# Patient Record
Sex: MALE | Race: WHITE | URBAN - METROPOLITAN AREA
[De-identification: names, ages, dates, MRNs, and addresses within clinical notes are randomized per-mention and may not be internally consistent; named-entity substitution may affect disease eponyms.]

---

## 2019-02-28 ENCOUNTER — HISTORICAL (OUTPATIENT)
Dept: ADMINISTRATIVE | Facility: HOSPITAL | Age: 49
End: 2019-02-28

## 2019-02-28 LAB
ABS NEUT (OLG): 3.1 X10(3)/MCL (ref 2.1–9.2)
ALBUMIN SERPL-MCNC: 4.5 GM/DL (ref 3.4–5)
ALBUMIN/GLOB SERPL: 1.45 {RATIO} (ref 1.5–2.5)
ALP SERPL-CCNC: 51 UNIT/L (ref 38–126)
ALT SERPL-CCNC: 30 UNIT/L (ref 7–52)
AST SERPL-CCNC: 15 UNIT/L (ref 15–37)
BILIRUB SERPL-MCNC: 0.6 MG/DL (ref 0.2–1)
BILIRUBIN DIRECT+TOT PNL SERPL-MCNC: 0.1 MG/DL (ref 0–0.5)
BILIRUBIN DIRECT+TOT PNL SERPL-MCNC: 0.5 MG/DL
BUN SERPL-MCNC: 17 MG/DL (ref 7–18)
CALCIUM SERPL-MCNC: 8.7 MG/DL (ref 8.5–10)
CHLORIDE SERPL-SCNC: 104 MMOL/L (ref 98–107)
CHOLEST SERPL-MCNC: 152 MG/DL (ref 0–200)
CHOLEST/HDLC SERPL: 4.6 {RATIO}
CO2 SERPL-SCNC: 30 MMOL/L (ref 21–32)
CREAT SERPL-MCNC: 1.07 MG/DL (ref 0.6–1.3)
ERYTHROCYTE [DISTWIDTH] IN BLOOD BY AUTOMATED COUNT: 12.6 % (ref 11.5–17)
GLOBULIN SER-MCNC: 3.1 GM/DL (ref 1.2–3)
GLUCOSE SERPL-MCNC: 107 MG/DL (ref 74–106)
HCT VFR BLD AUTO: 48.1 % (ref 42–52)
HDLC SERPL-MCNC: 33 MG/DL (ref 35–60)
HGB BLD-MCNC: 15.5 GM/DL (ref 14–18)
LDLC SERPL CALC-MCNC: 80 MG/DL (ref 0–129)
LYMPHOCYTES # BLD AUTO: 1.6 X10(3)/MCL (ref 0.6–3.4)
LYMPHOCYTES NFR BLD AUTO: 31.7 % (ref 13–40)
MCH RBC QN AUTO: 28.9 PG (ref 27–31.2)
MCHC RBC AUTO-ENTMCNC: 32 GM/DL (ref 32–36)
MCV RBC AUTO: 90 FL (ref 80–94)
MONOCYTES # BLD AUTO: 0.4 X10(3)/MCL (ref 0.1–1.3)
MONOCYTES NFR BLD AUTO: 8.5 % (ref 0.1–24)
NEUTROPHILS NFR BLD AUTO: 59.8 % (ref 47–80)
PLATELET # BLD AUTO: 191 X10(3)/MCL (ref 130–400)
PMV BLD AUTO: 9.4 FL (ref 9.4–12.4)
POTASSIUM SERPL-SCNC: 4.4 MMOL/L (ref 3.5–5.1)
PROT SERPL-MCNC: 7.6 GM/DL (ref 6.4–8.2)
PSA SERPL-MCNC: 0.46 NG/ML (ref 0–3.5)
RBC # BLD AUTO: 5.36 X10(6)/MCL (ref 4.7–6.1)
SODIUM SERPL-SCNC: 138 MMOL/L (ref 136–145)
TRIGL SERPL-MCNC: 252 MG/DL (ref 30–150)
TSH SERPL-ACNC: 1.31 MIU/ML (ref 0.35–4.94)
VLDLC SERPL CALC-MCNC: 50.4 MG/DL
WBC # SPEC AUTO: 5.1 X10(3)/MCL (ref 4.5–11.5)

## 2019-11-04 ENCOUNTER — HISTORICAL (OUTPATIENT)
Dept: ADMINISTRATIVE | Facility: HOSPITAL | Age: 49
End: 2019-11-04

## 2019-11-04 LAB
ALBUMIN SERPL-MCNC: 4.5 GM/DL (ref 3.4–5)
ALBUMIN/GLOB SERPL: 1.55 {RATIO} (ref 1.5–2.5)
ALP SERPL-CCNC: 47 UNIT/L (ref 38–126)
ALT SERPL-CCNC: 22 UNIT/L (ref 7–52)
AST SERPL-CCNC: 15 UNIT/L (ref 15–37)
BILIRUB SERPL-MCNC: 0.5 MG/DL (ref 0.2–1)
BILIRUBIN DIRECT+TOT PNL SERPL-MCNC: 0.1 MG/DL (ref 0–0.5)
BILIRUBIN DIRECT+TOT PNL SERPL-MCNC: 0.4 MG/DL
BUN SERPL-MCNC: 15 MG/DL (ref 7–18)
CALCIUM SERPL-MCNC: 9 MG/DL (ref 8.5–10)
CHLORIDE SERPL-SCNC: 103 MMOL/L (ref 98–107)
CO2 SERPL-SCNC: 26 MMOL/L (ref 21–32)
CREAT SERPL-MCNC: 1.17 MG/DL (ref 0.6–1.3)
GLOBULIN SER-MCNC: 2.9 GM/DL (ref 1.2–3)
GLUCOSE SERPL-MCNC: 103 MG/DL (ref 74–106)
POTASSIUM SERPL-SCNC: 4.5 MMOL/L (ref 3.5–5.1)
PROT SERPL-MCNC: 7.4 GM/DL (ref 6.4–8.2)
SODIUM SERPL-SCNC: 138 MMOL/L (ref 136–145)

## 2022-04-10 ENCOUNTER — HISTORICAL (OUTPATIENT)
Dept: ADMINISTRATIVE | Facility: HOSPITAL | Age: 52
End: 2022-04-10

## 2022-04-29 VITALS
DIASTOLIC BLOOD PRESSURE: 94 MMHG | HEIGHT: 71 IN | BODY MASS INDEX: 30.92 KG/M2 | WEIGHT: 220.88 LBS | SYSTOLIC BLOOD PRESSURE: 146 MMHG

## 2022-05-02 NOTE — HISTORICAL [facility] CERNER
This is a historical note converted from [employer]. Formatting and pictures may have been removed.  Please reference Romulo for original formatting and attached multimedia. Chief Complaint  WELLNESS CPX FAST, EVAL LUMP IN TESTICLE AREA  History of Present Illness  48 year old WM presents fasting for annual wellness  No PMH, No Meds  ?  , Works as   No Tob, EtOH: rare  Exercise: infrequent  Sodas: 3-4 diet sodas/week, 4-5 cups coffee/day  ?  Colonoscopy (2019)- Dr Cortez- 1 polyp- Due again in 2024  ?  Complains of lump area near left testicle x 6 months. States that area is non tender and seems to notice it more at end of day. Usually goes away when he lies down.  Review of Systems  Constitutional:?no fever, fatigue, weakness  Eye:?no vision loss, eye redness, drainage, or pain  ENMT:?no sore throat, ear pain, sinus pain/congestion, nasal congestion/drainage  Respiratory:?no cough, no wheezing, no shortness of breath  Cardiovascular:?no chest pain, no palpitations, no edema  Gastrointestinal:?no nausea, vomiting, or diarrhea. No abdominal pain  Genitourinary:?no dysuria, no urinary frequency or urgency, no hematuria  Hema/Lymph:?no abnormal bruising or bleeding  Endocrine:?no heat or cold intolerance, no excessive thirst or excessive urination  Musculoskeletal:?no muscle or joint pain, no joint swelling  Integumentary:?no skin rash or abnormal lesion  Neurologic: no headache, no dizziness, no weakness or numbness  ?  Physical Exam  Vitals & Measurements  T:?36.9? ?C (Oral)? HR:?72(Peripheral)? BP:?118/86?  HT:?180?cm? WT:?101.7?kg? BMI:?31.39?  General:?well-developed well-nourished in no acute distress  Eye: PERRLA, EOMI, clear conjunctiva, eyelids normal  HENT:?TMs/ear canals clear, oropharynx without erythema/exudate, oropharynx and nasal mucosal surfaces moist, no maxillary/frontal sinus tenderness to palpation  Neck: full range of motion, no thyromegaly or  lymphadenopathy  Respiratory:?clear to auscultation bilaterally  Cardiovascular:?regular rate and rhythm without murmurs, gallops or rubs  Gastrointestinal:?soft, non-tender, non-distended with normal bowel sounds, without masses to palpation  Genitourinary: no CVA tenderness to palpation, small, spongy lump to left scrotal area c/w varicocele  Musculoskeletal:?full range of motion of all extremities/spine without limitation or discomfort  Integumentary: no rashes or skin lesions present  Neurologic: cranial nerves intact, no signs of peripheral neurological deficit, motor/sensory function intact  ?  Assessment/Plan  1.?Wellness examination?Z00.00  ?LABS: CBC, CMP, TSH, FLP, PSA  Ordered:  CBC w/ Auto Diff, Routine collect, 02/28/19 8:00:00 CST, Blood, Order for future visit, Stop date 02/28/19 8:00:00 CST, Lab Collect, Wellness examination, 02/28/19 8:00:00 CST  Comprehensive Metabolic Panel, Now collect, 02/28/19 8:00:00 CST, Blood, Order for future visit, Stop date 02/28/19 8:00:00 CST, Lab Collect, Wellness examination, 02/28/19 8:00:00 CST  Lab Collection Request, 02/28/19 8:00:00 CST, [facility] - [facility], 02/28/19 8:00:00 CST  Lipid Panel, Routine collect, 02/28/19 8:00:00 CST, Blood, Order for future visit, Stop date 02/28/19 8:00:00 CST, Lab Collect, Wellness examination, 02/28/19 8:00:00 CST  Preventative Health Care Est 40-64 years [id number] PC, Wellness examination, [facility] - [facility], 02/28/19 8:00:00 CST  Prostate Specific Antigen, Routine collect, 02/28/19 8:00:00 CST, Blood, Order for future visit, Stop date 02/28/19 8:00:00 CST, Lab Collect, Wellness examination, 02/28/19 8:00:00 CST  Thyroid Stimulating Hormone, Routine collect, 02/28/19 8:00:00 CST, Blood, Order for future visit, Stop date 02/28/19 8:00:00 CST, Lab Collect, Wellness examination, 02/28/19 8:00:00 CST  ?  2.?Prostate cancer screening?Z12.5  ?  3.?Left varicocele?I86.1  ?Advised patient that testicular lump most likely varicocele.  ?We can get  U/S done if patient desires, but reassured him this condition is benign  ?  Orders:  Clinic Follow-up PRN, 02/28/19 8:16:00 CST, [facility] - [facility], Future Order   Problem List/Past Medical History  Ongoing  Obesity  Historical  No qualifying data  Procedure/Surgical History  Colonoscopy (01/16/2019)  Esophagogastroduodenoscopy (06/03/2016)  hernia repair x2  Vasectomy   Medications  lysine 500 mg oral tablet, 500 mg= 1 tab(s), Oral, Daily  multivitamin with minerals (Adult Tab), 1 tab(s), Oral, Daily  Allergies  No Known Medication Allergies  Social History  Alcohol  Current, 1-2 times per year, 02/28/2019  Employment/School  Employed, 02/28/2019  Home/Environment  Lives with Children, Spouse., 02/28/2019  Nutrition/Health  Regular, 02/28/2019  Substance Abuse  Never, 02/28/2019  Tobacco  Never (less than 100 in lifetime), N/A, 02/28/2019  Family History  Acute myocardial infarction.: Grandfather and Grandmother.  Hypertension.: Father.  Hypothyroidism: Mother.  Primary malignant neoplasm of colon: Grandmother.  Stroke: Grandfather.  Immunizations  Vaccine Date Status   tetanus/diphth/pertuss (Tdap) adult/adol 03/26/2009 Recorded   Health Maintenance  Health Maintenance  ???Pending?(in the next year)  ??? ??Due?  ??? ? ? ?ADL Screening due??02/28/19??and every 1??year(s)  ??? ? ? ?Alcohol Misuse Screening due??02/28/19??and every 1??year(s)  ??? ? ? ?Aspirin Therapy for CVD Prevention due??02/28/19??and every 1??year(s)  ??? ??Due In Future?  ??? ? ? ?Tetanus Vaccine not due until??03/26/19??and every 10??year(s)  ???Satisfied?(in the past 1 year)  ??? ??Satisfied?  ??? ? ? ?Blood Pressure Screening on??02/28/19.??Satisfied by Antoinette Aguilar LPN  ??? ? ? ?Body Mass Index Check on??02/28/19.??Satisfied by Antoinette Aguilar LPN  ??? ? ? ?Colorectal Screening on??01/16/19.??Satisfied by Antoinette Aguilar LPN  ??? ? ? ?Depression Screening on??02/28/19.??Satisfied by Antoinette Aguilar LPN  ??? ? ? ?Influenza Vaccine  on??02/28/19.??Satisfied by Antoinette Aguilar LPN  ??? ? ? ?Obesity Screening on??02/28/19.??Satisfied by Antoinette Aguilar LPN  ?  ?

## 2022-05-02 NOTE — HISTORICAL [facility] CERNER
This is a historical note converted from [employer]. Formatting and pictures may have been removed.  Please reference [employer] for original formatting and attached multimedia. Chief Complaint  EVAL SPOTS ON LOWER LEGS, SWELLING AND PAIN AFTER WALKING ALOT.  History of Present Illness  Patient presents today with complaint of?bilateral lower extremity edema, and some discoloration of veins. ?Seems to have gotten worse after her trip to [place] [employer] while walking 10+ miles a day.  Review of Systems  Constitutional:?No fever, no weakness, no weight loss, no fatigue  Respiratory:????No wheezing,?no shortness of breath  Cardiovascular:??No chest pain, no KIM  Gastrointestinal:?No nausea, vomiting, or diarrhea. No abdominal pain  Musculoskeletal:?No muscle or joint pain, just swelling  Integumentary:???Darkened skin?to shin area  Neuro:??No headaches, dizziness, or weakness  Endo:??No polyuria, polydipsia, or polyphagia  Heme/Lymph:??No bruising or lymphadenopathy  Physical Exam  Vitals & Measurements  T:?37? ?C (Oral)? HR:?76(Peripheral)? BP:?146/94?  HT:?180?cm? WT:?100.5?kg? BMI:?30.93?  General: ???? ? ? ? ? Overweight, well-nourished, in no acute distress  Respiratory:??? ? ?Clear to auscultation bilaterally  Cardiovascular:?Regular rate and rhythm without murmurs, gallops or rubs  Abdomen: ? ? ? ? ?Soft, NT, ND, NABS x4, no HSM  Musculoskeletal:?1+ edema bilateral lower extremities, 2+ pulses, capillary refill less than 2 seconds.  Neuro: ? ? ? ? ? ? ? ? ?No motor/sensory deficits  Integumentary: ??Brownish discoloration?to skin bilateral lower extremities consistent with?age-related changes.  LN:?WNL  Assessment/Plan  1.?Edema of bilateral orbit?H05.223  ?Patient wishes to avoid medications  We will keep lower extremities elevated when possible  We will get compression stockings for bilateral lower extremities wear daily  Patient will begin weight loss program?he has been thinking about it for sometimes?this is the motivation  I think I been needing  ?  CMP today  Ordered:  Comprehensive Metabolic Panel, Routine collect, 11/04/19 15:44:00 CST, Blood, Order for future visit, Stop date 11/04/19 15:44:00 CST, Lab Collect, Edema of bilateral orbit, 11/04/19 15:44:00 CST  Office/Outpatient Visit Level 3 Established [id number] PC, Edema of bilateral orbit, [facility] - [facility], 11/04/19 15:45:00 CST  ?  Orders:  Clinic Follow-up PRN, 11/04/19 15:46:00 CST, [facility] - [facility], Future Order  Lab Collection Request, 11/04/19 15:44:00 CST, [facility] - [facility], 11/04/19 15:44:00 CST  Referrals  Clinic Follow-up PRN, 11/04/19 15:46:00 CST, [facility] - [facility], Future Order   Problem List/Past Medical History  Ongoing  Obesity  Historical  No qualifying data  Procedure/Surgical History  Colonoscopy (01/16/2019)  Esophagogastroduodenoscopy (06/03/2016)  hernia repair x2  Vasectomy   Medications  lysine 500 mg oral tablet, 500 mg= 1 tab(s), Oral, Daily  multivitamin with minerals (Adult Tab), 1 tab(s), Oral, Daily  Allergies  No Known Medication Allergies  Social History  Abuse/Neglect  No, 11/04/2019  Alcohol  Current, 1-2 times per year, 02/28/2019  Employment/School  Employed, 02/28/2019  Home/Environment  Lives with Children, Spouse., 02/28/2019  Nutrition/Health  Regular, 02/28/2019  Substance Use  Never, 02/28/2019  Tobacco  Never (less than 100 in lifetime), N/A, 11/04/2019  Never (less than 100 in lifetime), N/A, 02/28/2019  Family History  Acute myocardial infarction.: Grandfather and Grandmother.  Hypertension.: Father.  Hypothyroidism: Mother.  Primary malignant neoplasm of colon: Grandmother.  Stroke: Grandfather.  Immunizations  Vaccine Date Status   tetanus/diphth/pertuss (Tdap) adult/adol 03/26/2009 Recorded   Health Maintenance  Health Maintenance  ???Pending?(in the next year)  ??? ??OverDue  ??? ? ? ?Diabetes Screening due??and every?  ??? ? ? ?Alcohol Misuse Screening due??01/01/19??and every 1??year(s)  ??? ? ? ?Tetanus Vaccine due??03/26/19??and every  10??year(s)  ??? ??Due?  ??? ? ? ?ADL Screening due??11/04/19??and every 1??year(s)  ??? ? ? ?Aspirin Therapy for CVD Prevention due??11/04/19??and every 1??year(s)  ??? ? ? ?Influenza Vaccine due??11/04/19??and every?  ??? ??Due In Future?  ??? ? ? ?Obesity Screening not due until??01/01/20??and every 1??year(s)  ??? ? ? ?Blood Pressure Screening not due until??11/03/20??and every 1??year(s)  ??? ? ? ?Body Mass Index Check not due until??11/03/20??and every 1??year(s)  ??? ? ? ?Depression Screening not due until??11/03/20??and every 1??year(s)  ???Satisfied?(in the past 1 year)  ??? ??Satisfied?  ??? ? ? ?Blood Pressure Screening on??11/04/19.??Satisfied by Antoinette Aguilar LPN  ??? ? ? ?Body Mass Index Check on??11/04/19.??Satisfied by Antoinette Aguilar LPN  ??? ? ? ?Colorectal Screening on??01/16/19.??Satisfied by Antoinette Aguilar LPN  ??? ? ? ?Depression Screening on??11/04/19.??Satisfied by Antoinette Aguilar LPN  ??? ? ? ?Diabetes Screening on??02/28/19.??Satisfied by Verónica Jacome  ??? ? ? ?Influenza Vaccine on??11/04/19.??Satisfied by Antoinette Aguilar LPN  ??? ? ? ?Lipid Screening on??02/28/19.??Satisfied by Verónica Jacome  ??? ? ? ?Obesity Screening on??11/04/19.??Satisfied by Antoinette Aguilar LPN  ?

## 2024-04-18 PROBLEM — K21.9 GERD (GASTROESOPHAGEAL REFLUX DISEASE): Status: ACTIVE | Noted: 2024-04-18

## 2024-04-19 PROCEDURE — 83690 ASSAY OF LIPASE: CPT | Performed by: STUDENT IN AN ORGANIZED HEALTH CARE EDUCATION/TRAINING PROGRAM

## 2024-05-02 PROBLEM — Z00.00 WELLNESS EXAMINATION: Status: ACTIVE | Noted: 2024-05-02

## 2024-05-02 PROBLEM — I87.2 CHRONIC VENOUS INSUFFICIENCY OF LOWER EXTREMITY: Status: ACTIVE | Noted: 2024-05-02

## 2024-05-02 PROBLEM — E78.6 LOW HDL (UNDER 40): Status: ACTIVE | Noted: 2024-05-02
